# Patient Record
Sex: FEMALE | Race: BLACK OR AFRICAN AMERICAN | Employment: FULL TIME | URBAN - METROPOLITAN AREA
[De-identification: names, ages, dates, MRNs, and addresses within clinical notes are randomized per-mention and may not be internally consistent; named-entity substitution may affect disease eponyms.]

---

## 2023-03-24 PROBLEM — O47.03 PRETERM UTERINE CONTRACTIONS, ANTEPARTUM, THIRD TRIMESTER: Status: ACTIVE | Noted: 2023-03-24

## 2023-03-24 PROBLEM — Z3A.34 34 WEEKS GESTATION OF PREGNANCY: Status: ACTIVE | Noted: 2023-03-24

## 2023-04-22 ENCOUNTER — HOSPITAL ENCOUNTER (EMERGENCY)
Age: 33
Discharge: HOME | End: 2023-04-22
Attending: OBSTETRICS & GYNECOLOGY | Admitting: OBSTETRICS & GYNECOLOGY
Payer: MEDICAID

## 2023-04-22 VITALS
WEIGHT: 240 LBS | BODY MASS INDEX: 37.67 KG/M2 | DIASTOLIC BLOOD PRESSURE: 72 MMHG | HEART RATE: 95 BPM | SYSTOLIC BLOOD PRESSURE: 107 MMHG | HEIGHT: 67 IN

## 2023-04-22 PROCEDURE — 76815 OB US LIMITED FETUS(S): CPT

## 2023-04-22 PROCEDURE — 59025 FETAL NON-STRESS TEST: CPT

## 2023-04-22 PROCEDURE — 99282 EMERGENCY DEPT VISIT SF MDM: CPT

## 2023-04-22 RX ORDER — DOCUSATE SODIUM 100 MG/1
100 CAPSULE, LIQUID FILLED ORAL DAILY
COMMUNITY

## 2023-04-22 RX ORDER — GUAIFENESIN 100 MG/5ML
81 LIQUID (ML) ORAL DAILY
COMMUNITY

## 2023-04-22 NOTE — PROGRESS NOTES
[address] [facility] Ian Pack is a 29 y.o.   at 38w3d patient who presents to L&D triage with c/o leaking of fluid since 9am today. She reports Positive FM, denies vaginal bleeding and contractions. She also denies Headaches, Scotoma, RUQ pain, and Edema. Urine sample obtained. EFM and toco placed for initial assessment. 1- Dr. Miah Paez at bedside for assessment. Nitrazine and amnisure negative. Bedside ultrasound by  VERONA 16.9. SVE- 1cm, unchanged from office exam earlier this week, per patient. 1340- Per Dr. Miah Paez, pt is to be discharged to home. RN instructed pt to change and RN will return for discharge instructions. 1350- patient left unit before obtaining discharge instructions/papers.

## 2023-04-22 NOTE — PROGRESS NOTES
NST/BPP:    Baseline: 130    Variability: moderate    Accelerations: Two 15 x 15 accelerations in a ten minute window. Decelerations: None    Contractions: None    RNST    BPP:    VERONA= 16.9    Breathing > 30 secs    Multiple tone movements    Multiple gross FM    BPP : 10/10    This test was preformed and interpreted by me.     Maritza Wong M.D.

## 2023-04-22 NOTE — H&P
History & Physical    Name: Candace Funez MRN: [id number]  SSN: [id number]    YOB: 1990  Age: 29 y.o. Sex: female        Subjective:   CC: Some trickling  Estimated Date of Delivery: 5/3/23  OB History    Para Term  AB Living   2 1 1     1   SAB IAB Ectopic Molar Multiple Live Births             1      # Outcome Date GA Lbr Eliezer/2nd Weight Sex Delivery Anes PTL Lv   2 Current            1 Term 14 37w5d 14:35 / 00:11 3.255 kg F VAGINAL DELI EPIDURAL AN, SPINAL AN N YUMI       Ms. Kurt Villanueva presents with pregnancy at 38w3d for  evaluation of a few episodes of small \"trickles \" of vaginal fluid since 0900 hrs today. No contractions, vaginal bleeding, or decreased FM . Prenatal course was complicated by  anemia for which she is on Fe . Prenatal records are not available for details but her prior delivery record from  here was reviewed. Patient receives her prenatal care in [facility] and is visiting her mother in this area. Past Medical History:   Diagnosis Date    Menarche     onset at age 8     Past Surgical History:   Procedure Laterality Date    HX OTHER SURGICAL      wisdom teeth     Social History     Occupational History    Not on file   Tobacco Use    Smoking status: Never    Smokeless tobacco: Not on file   Substance and Sexual Activity    Alcohol use: No    Drug use: No    Sexual activity: Yes     Partners: Male     Family History   Problem Relation Age of Onset    Hypertension Mother     Migraines Mother     Migraines Sister     Sickle Cell Trait Brother     Hypertension Maternal Aunt     Hypertension Maternal Uncle     Diabetes Maternal Grandmother      No Known Allergies  Prior to Admission medications    Medication Sig Start Date End Date Taking? Authorizing Provider   prenatal 22/NUSK fum/folic/dha (PRENATAL-1 PO) Take  by mouth. Yes Provider, Historical   aspirin 81 mg chewable tablet Take 1 Tablet by mouth daily.    Yes Provider, Historical   docusate sodium (Colace) 100 mg capsule Take 1 Capsule by mouth daily. Yes Provider, Historical        Review of Systems   Constitutional:  Negative for fever. Eyes:  Negative for visual disturbance. Respiratory:  Negative for cough. Cardiovascular:  Negative for leg swelling. Gastrointestinal:  Negative for abdominal pain. Genitourinary:  Negative for dysuria and vaginal bleeding. Musculoskeletal:  Negative for back pain. Objective:     Vitals:  Vitals:    04/22/23 1252   Weight: 108.9 kg (240 lb)   Height: 5' 7\" (1.702 m)        Physical Exam  Vitals and nursing note reviewed. Exam conducted with a chaperone present. Constitutional:       General: She is not in acute distress. Appearance: Normal appearance. She is normal weight. She is not ill-appearing, toxic-appearing or diaphoretic. HENT:      Head: Normocephalic and atraumatic. Right Ear: External ear normal.      Left Ear: External ear normal.   Eyes:      General: No scleral icterus. Extraocular Movements: Extraocular movements intact. Cardiovascular:      Rate and Rhythm: Normal rate and regular rhythm. Heart sounds: Normal heart sounds. No murmur heard. No friction rub. No gallop. Pulmonary:      Effort: No respiratory distress. Breath sounds: No stridor. No wheezing, rhonchi or rales. Abdominal:      General: Bowel sounds are normal. There is no distension. Palpations: Abdomen is soft. There is no mass. Tenderness: There is no abdominal tenderness. There is no right CVA tenderness, left CVA tenderness, guarding or rebound. Hernia: No hernia is present. Genitourinary:     General: Normal vulva. Musculoskeletal:         General: No swelling, tenderness, deformity or signs of injury. Normal range of motion. Cervical back: Normal range of motion. No rigidity or tenderness. Right lower leg: No edema. Left lower leg: No edema. Lymphadenopathy:      Cervical: No cervical adenopathy. Skin:     General: Skin is warm and dry. Capillary Refill: Capillary refill takes less than 2 seconds. Coloration: Skin is not jaundiced or pale. Findings: No bruising, erythema, lesion or rash. Neurological:      Mental Status: She is alert and oriented to person, place, and time. Deep Tendon Reflexes: Reflexes normal.   Psychiatric:         Mood and Affect: Mood normal.         Behavior: Behavior normal.         Thought Content: Thought content normal.         Judgment: Judgment normal.       Cervical Exam: 1 cm dilated    Uterine Activity: None  Membranes: Intact, neg amnisure and ntz  Fetal Heart Rate: Reactive     Prenatal Labs:   Lab Results   Component Value Date/Time    Rubella, External Equivocal 12/05/2013 12:00 AM    GrBStrep, External Negative 06/09/2014 12:00 AM    HBsAg, External negative 12/05/2013 12:00 AM    HIV, External negative 12/05/2013 12:00 AM    RPR, External negative 12/05/2013 12:00 AM    Gonorrhea, External negative 12/05/2013 12:00 AM    Chlamydia, External negative 12/05/2013 12:00 AM    ABO,Rh B positive 12/05/2013 12:00 AM          Impression/Plan:     1) false labor at term, no evidence for ROM     Plan: Home with labor and FM precautions. F/U as scheduled on Monday for Ob appt.

## 2023-05-01 PROBLEM — Z3A.39 39 WEEKS GESTATION OF PREGNANCY: Status: ACTIVE | Noted: 2023-05-01
